# Patient Record
Sex: FEMALE | Race: BLACK OR AFRICAN AMERICAN | NOT HISPANIC OR LATINO | URBAN - METROPOLITAN AREA
[De-identification: names, ages, dates, MRNs, and addresses within clinical notes are randomized per-mention and may not be internally consistent; named-entity substitution may affect disease eponyms.]

---

## 2021-07-02 ENCOUNTER — TELEPHONE (OUTPATIENT)
Dept: GASTROENTEROLOGY | Facility: CLINIC | Age: 56
End: 2021-07-02

## 2022-02-07 ENCOUNTER — TELEPHONE (OUTPATIENT)
Dept: NEUROLOGY | Facility: CLINIC | Age: 57
End: 2022-02-07

## 2022-02-07 ENCOUNTER — PROCEDURE VISIT (OUTPATIENT)
Dept: NEUROLOGY | Facility: CLINIC | Age: 57
End: 2022-02-07
Payer: COMMERCIAL

## 2022-02-07 DIAGNOSIS — R52 PAIN: ICD-10-CM

## 2022-02-07 PROCEDURE — 95911 NRV CNDJ TEST 9-10 STUDIES: CPT | Performed by: PHYSICAL MEDICINE & REHABILITATION

## 2022-02-07 PROCEDURE — 95886 MUSC TEST DONE W/N TEST COMP: CPT | Performed by: PHYSICAL MEDICINE & REHABILITATION

## 2022-02-07 NOTE — PROGRESS NOTES
EMG 2 Limb Upper Extremity     Date/Time 2/7/2022 9:18 AM     Performed by  Kumar Moss MD     Authorized by Johnny Franco MD                [facility] AT [address]  [phone]    Electromyography & Nerve Conduction Studies Report          Full Name: Corrinne Ditty Gender: Female  MRN: [id number] YOB: 1965      Visit Date: 2/7/2022 8:31 AM  Age: 49 Years  Examining Physician: Kumar Moss MD   Referring Physician: DR GONSALEZ    Medical History: 50 y/o right-handed female presents with numbness and tingling in both upper extremities for about one year now, extending from the elbow to the wrist on the right and from the wrist into the fingers on the left     She is a diabetic and takes insulin  TEMP 32       Sensory Nerve Conduction Study       Nerve / Sites Rec  Site Onset Lat Peak Lat  Amp Segments Distance Velocity     ms ms µV  cm m/s   R Median - Dig II (Antidromic)      Wrist Index 3 3 4 4 0 50 Wrist - Index 13 39      Ref  ?3 5 ? 20 0 Ref  ?50   L Median - Dig II (Antidromic)      Wrist Index 5 7 7 3 4 3 Wrist - Index 13 23      Ref  ?3 5 ? 20 0 Ref  ?50   R Ulnar - Dig V (Antidromic)      Wrist Dig V 2 9 3 6 26 6 Wrist - Dig V 11 38      Ref  ?3 1 ? 17 0 Ref  ?50   L Ulnar - Dig V (Antidromic)      Wrist Dig V 2 7 3 4 18 5 Wrist - Dig V 11 41      Ref  ?3 1 ? 17 0 Ref  ?50   R Radial - Superficial (Antidromic)      Forearm Wrist 1 7 2 4 23 1 Forearm - Wrist 10 58      Ref  ?2 9 ? 15 0 Ref  ?50   L Radial - Superficial (Antidromic)      Forearm Wrist 1 6 2 1 16 6 Forearm - Wrist 10 64      Ref  ?2 9 ? 15 0 Ref  ?50       Motor Nerve Conduction Study       Nerve / Sites Muscle Latency Ref  Amplitude Ref  Segments Distance Lat Diff Velocity Ref       ms ms mV mV  cm ms m/s m/s   R Median - APB      Wrist APB 6 6 ?4 4 6 7 ?4 0 Wrist - APB 7         Elbow APB 11 6  6 1  Elbow - Wrist 24 5 02 48 ?49   L Median - APB Palm APB 5 8  9 5  Palm - APB          Elbow APB 10 8  8 5  Elbow - Palm 24 5 5 02 49 ?49   R Ulnar - ADM      Wrist ADM 2 9 ?3 3 10 7 ? 6 0 Wrist - ADM 7         B  Elbow ADM 7 6  10 6  B  Elbow - Wrist 24 4 71 51 ?49      A  Elbow ADM 9 9  10 2  A  Elbow - B  Elbow 10 2 23 45 ?49   L Ulnar - ADM      Wrist ADM 3 0 ?3 3 9 2 ?6 0 Wrist - ADM 7         B  Elbow ADM 7 3  8 7  B  Elbow - Wrist 23 5 4 35 54 ?49      A  Elbow ADM 9 9  8 7  A  Elbow - B  Elbow 10 2 60 38 ?52       F Waves       Nerve F Latency Ref  ms ms   R Median - APB 26 1 ? 31 0   R Ulnar - ADM 30 0 ?32 0   L Median - APB 24 8 ? 31 0   L Ulnar - ADM 30 4 ?32 0       EMG Summary Table     Spontaneous MUAP Recruitment   Muscle Nerve Roots IA Fib PSW Fasc H F  Dur  Amp PPP Config Pattern   L  First dorsal interosseous Ulnar C8-T1 NL None None None None NL NL Few NL Reduced   L  Deltoid Axillary C5-C6 NL None None None None NL NL None NL NL   R  Deltoid Axillary C5-C6 NL None None None None NL NL None NL NL   L  Biceps brachii Musculocut  C5-C6 NL None None None None NL NL None NL NL   L  Triceps brachii Radial C6-C8 NL None None None None NL NL None NL NL   L  Pronator teres Median C6-C7 NL None None None None NL NL None NL NL   L  Abductor pollicis brevis Median Q4-E0 NL None None None None Sl  Incr  Zahraa Dross  None NL Reduced   L  Cervical paraspinals (low)  - NL None None None None NL NL None NL NL   R  First dorsal interosseous Ulnar C8-T1 NL None None None None Gr  Incr  NL Many NL Reduced   R  Biceps brachii Musculocut  C5-C6 NL None None None None NL NL None NL NL   R  Triceps brachii Radial C6-C8 NL None None None None NL NL None NL NL   R  Pronator teres Median C6-C7 NL None None None None NL NL None NL NL   R  Abductor pollicis brevis Median S9-M9 NL None None None None NL Sl  Incr  None NL Reduced   R  Cervical paraspinals (low)  - NL None None None None NL NL None NL NL   L   Flexor carpi ulnaris Ulnar C7-T1 NL None None None None NL NL None NL NL   R  Flexor carpi ulnaris Ulnar C7-T1 NL None None None None NL NL None NL NL                                   Summary      The left median compound motor action potential was normal   The   right median motor terminal latency was prolonged with normal compound motor action potential amplitude and normal conduction velocity across the wrist   The left ulnar motor terminal latency was normal with normal compound motor action potential amplitude and a slow conduction velocity across the elbow  The right ulnar motor terminal latency was normal with normal compound motor action potential amplitude and slow conduction velocity across the elbow  The left and right median peak latencies were prolonged with a low sensory action potential amplitude and a slow conduction velocity across the wrist   The right and ulnar sensory peak latency was prolonged with normal sensory action potential amplitude and a slow conduction velocity across the wrist     The left and right median and ulnar F wave latencies were within normal limits  Concentric needle EMG was performed on various proximal and distal muscles of the bilateral upper extremities   There was no evidence of active denervation any of the muscles tested  Mild decreased recruitment of giant motor units was noted in the bilateral abductor pollicis brevis and moderate decreased recruitment of giant motor units was noted in the bilateral FDI  Early recruited motor units appear normal with recruitment patterns being full or full for effort in the remaining muscles tested  Impression:        Abnormal study  There is electrophysiologic evidence of a:    1  Bilateral median nerve compression neuropathy at the wrist with demyelinative changes, moderate in severity on the right and mild in severity on the left, consistent with a diagnosis of carpal tunnel syndrome      2  Bilateral ulnar neuropathy, localized best across the elbow with demyelinative changes  3  There is no evidence of a cervical radiculopathy bilaterally

## 2022-02-07 NOTE — TELEPHONE ENCOUNTER
The only notes from Dr Cherri Wang office states lower extremities, however order is upper extremities    Please advise